# Patient Record
Sex: MALE | Race: WHITE | NOT HISPANIC OR LATINO | Employment: FULL TIME | URBAN - METROPOLITAN AREA
[De-identification: names, ages, dates, MRNs, and addresses within clinical notes are randomized per-mention and may not be internally consistent; named-entity substitution may affect disease eponyms.]

---

## 2017-11-28 ENCOUNTER — ALLSCRIPTS OFFICE VISIT (OUTPATIENT)
Dept: OTHER | Facility: OTHER | Age: 42
End: 2017-11-28

## 2017-11-28 ENCOUNTER — TRANSCRIBE ORDERS (OUTPATIENT)
Dept: ADMINISTRATIVE | Facility: HOSPITAL | Age: 42
End: 2017-11-28

## 2017-11-28 DIAGNOSIS — R19.4 CHANGE IN BOWEL HABITS: ICD-10-CM

## 2017-11-28 DIAGNOSIS — R19.4 CHANGE IN BOWEL HABITS: Primary | ICD-10-CM

## 2017-11-29 NOTE — CONSULTS
Assessment    1  Left flank pain (789 09) (R10 9)   2  Change in bowel function (787 99) (R19 4)   3  Loss of appetite (783 0) (R63 0)   4  Recent weight loss (783 21) (R63 4)    Plan  Change in bowel function    · * CT ABDOMEN PELVIS W CONTRAST; Status:Need Information - Financial Authorization; Requested for:28Nov2017;    Perform:[facility]; [phone]; Ordered; For:Change in bowel function; Ordered By:Lucas Luna;  Recent weight loss    · Suprep Bowel Prep Kit 17 5-3 13-1 6 GM/180ML Oral Solution; USE AS DIRECTED   Rx By: Susy Merlin; Dispense: 0 Days ; #:1 X 177 ML Bottle (2 Bottles); Refill: 0;For: Recent weight loss; JENNIFER = N; Verified Transmission to [facility] #7877 Last Updated By: System, SureScripts; 11/28/2017 8:46:00 AM   · COLONOSCOPY (GI, SURG); Status:Active; Requested for:28Nov2017;    Perform:[facility]; [phone]; Last Updated By:Morales, Darryle Jubilee; 11/28/2017 8:59:08 AM;Ordered; For:Recent weight loss; Ordered By:Lucas Luna;   · EGD; Status:Active; Requested for:28Nov2017;    Perform:[facility]; Due:[id number]; Last Updated By:Morales, Darryle Jubilee; 11/28/2017 8:59:08 AM;Ordered;weight loss; Ordered By:Lucas Luna;    Discussion/Summary  Discussion Summary:   Pleasant 13-year-old gentleman will with 1 month of left flank pain, found to have elevated lipase, recent change in bowel movements with new onset constipation, weight loss and decreased appetite  Elevated lipase and left flank pain: Differential includes musculoskeletal pain, underlying GI or pancreatic pathology is high in the differential, especially this male who has had an undescended testiclewill start with CT scan of his abdomen and pelvis  Recent change in bowel movements and constipation with associated weight loss:  This may be secondary to problem 1 , however the patient also has a family history of colon cancerwill schedule the patient for both an upper endoscopy and colonoscopy due to his decreased appetite and GI symptomsdiscussed with him the risks of the procedure including bleeding, surgery, perforation, missed polyp detection rate  further recommendations after his endoscopic and imaging studies are completed  Chief Complaint  Chief Complaint Free Text Note Form: Elevated lipase      History of Present Illness  HPI: As you know this is a pleasant 57-year-old gentleman with longstanding history of fairly well controlled diabetes, hyperlipidemia has been stable on his medication who notes that about a month ago he started to developed left flank pain which has radiated from his left back to his left flank  It has been consistent, worse with certain positional changes, sometimes worse with eating  Patient denies any nausea or vomiting  He does note having decreased appetite approximately 5 lb weight loss and most recently change in bowel movements or from normal daily stools a bowel movement every other day  He denies any melena or rectal bleeding  He denies any vomiting  Denies any fevers, chills, sweats  He notes that his blood sugars have increased over this period of time  Recent laboratory studies demonstrated mild elevation of his lipase for which she was referred here  family history is notable for colon cancer in his father who was diagnosed in his early to mid 62s  There is no other history of GI or associated malignancies  Patient denies any physical activity which worsen his symptoms  He does note that warm heating pad over his left flank will help   has a remote history of an undescended testicle which was not retrieved, he has had laparoscopic exploration for this in his youth  He has a history of sleep apnea  Denies any tobacco, drinks occasionally  He works in information technology  Review of Systems  Complete-Male GI Adult: Other Symptoms: The remainder of the ten ROS was negative  Active Problems    1  Abdominal pain (789 00) (R10 9)   2   Diabetes mellitus (250 00) (E11 9)   3  Hyperlipidemia (272 4) (E78 5)   4  Loss of appetite (783 0) (R63 0)   5  Recent weight loss (783 21) (R63 4)   6  Sleep apnea (780 57) (G47 30)    Past Medical History  Active Problems And Past Medical History Reviewed: The active problems and past medical history were reviewed and updated today  Surgical History  Surgical History Reviewed: The surgical history was reviewed and updated today  Family History  Mother    1  Denied: Family history of Crohn's disease without complication, unspecified gastrointestinal tract location   2  Denied: Family history of colon cancer   3  Denied: Family history of liver disease  Father    4  Denied: Family history of Crohn's disease without complication, unspecified gastrointestinal tract location   5  Family history of colon cancer (V16 0) (Z80 0)   6  Denied: Family history of liver disease  Family History Reviewed: The family history was reviewed and updated today  Social History     · Never a smoker   · Occasional alcohol use  Social History Reviewed: The social history was reviewed and updated today  Current Meds   1  Aspirin 81 MG TABS; Therapy: (Recorded:28Nov2017) to Recorded   2  Lipitor 10 MG Oral Tablet; Therapy: (Recorded:28Nov2017) to Recorded   3  Losartan Potassium 50 MG Oral Tablet; Therapy: (Recorded:28Nov2017) to Recorded   4  MetFORMIN HCl - 500 MG Oral Tablet; Therapy: (Recorded:28Nov2017) to Recorded   5  Multi-Vitamin TABS; Therapy: (Recorded:28Nov2017) to Recorded  Medication List Reviewed: The medication list was reviewed and updated today  Allergies  1   No Known Drug Allergies    Vitals  Vital Signs    Recorded: 28Nov2017 08:25AM   Temperature 96 5 F   Heart Rate 101   Respiration 16   Systolic 710   Diastolic 72   Height 5 ft 9 in   Weight 205 lb    BMI Calculated 30 27   BSA Calculated 2 09   O2 Saturation 97       Physical Exam  Gen:  Overweight, wn/wd, NAD HEENT: anicteric, MMM, no cervical LAD CVS: RRR, left lower sternal border 2/6 systolic ejection murmur  CHEST: CTA b/l ABD: +BS, soft, NT,ND, no hepatosplenomegaly, moderate to large sized ventral hernia, there was some pinpoint tenderness in his left upper quadrant which was not reproducible EXT: no c/c/e NEURO: aaox3 SKIN: NO rashes      Future Appointments    Date/Time Provider Specialty Site   12/29/2017 08:40 AM BRISEIDA Peacock   [facility] [address] [place] [address]       Signatures   Electronically signed by : BRISEIDA Rader ; Nov 28 2017  9:35AM EST                       (Author)

## 2017-12-01 ENCOUNTER — HOSPITAL ENCOUNTER (OUTPATIENT)
Dept: CT IMAGING | Facility: HOSPITAL | Age: 42
Discharge: HOME | End: 2017-12-01
Attending: INTERNAL MEDICINE
Payer: COMMERCIAL

## 2017-12-01 DIAGNOSIS — R19.4 CHANGE IN BOWEL HABITS: ICD-10-CM

## 2017-12-01 PROCEDURE — 74177 CT ABD & PELVIS W/CONTRAST: CPT

## 2017-12-01 RX ADMIN — IOHEXOL 100 ML: 350 INJECTION, SOLUTION INTRAVENOUS at 08:40

## 2017-12-05 ENCOUNTER — GENERIC CONVERSION - ENCOUNTER (OUTPATIENT)
Dept: OTHER | Facility: OTHER | Age: 42
End: 2017-12-05

## 2017-12-19 ENCOUNTER — ANESTHESIA EVENT (OUTPATIENT)
Dept: PERIOP | Facility: AMBULARY SURGERY CENTER | Age: 42
End: 2017-12-19
Payer: COMMERCIAL

## 2017-12-22 RX ORDER — ASPIRIN 81 MG/1
81 TABLET ORAL
COMMUNITY

## 2017-12-22 RX ORDER — DIPHENOXYLATE HYDROCHLORIDE AND ATROPINE SULFATE 2.5; .025 MG/1; MG/1
1 TABLET ORAL
COMMUNITY

## 2017-12-22 RX ORDER — ATORVASTATIN CALCIUM 10 MG/1
10 TABLET, FILM COATED ORAL
COMMUNITY

## 2017-12-22 RX ORDER — LOSARTAN POTASSIUM 50 MG/1
25 TABLET ORAL
COMMUNITY

## 2017-12-22 NOTE — PRE-PROCEDURE INSTRUCTIONS
Pre-Surgery Instructions:   Medication Instructions    aspirin (ECOTRIN LOW STRENGTH) 81 mg EC tablet Instructed patient per Anesthesia Guidelines   atorvastatin (LIPITOR) 10 mg tablet Instructed patient per Anesthesia Guidelines   losartan (COZAAR) 50 mg tablet Instructed patient per Anesthesia Guidelines   metFORMIN (GLUCOPHAGE) 500 mg tablet Instructed patient per Anesthesia Guidelines   multivitamin (THERAGRAN) TABS Instructed patient per Anesthesia Guidelines      Pre procedural instructions reviewed-instructed to follow Dr Emilee Pastor instructions including bowel prep

## 2017-12-29 ENCOUNTER — GENERIC CONVERSION - ENCOUNTER (OUTPATIENT)
Dept: GASTROENTEROLOGY | Facility: CLINIC | Age: 42
End: 2017-12-29

## 2017-12-29 ENCOUNTER — HOSPITAL ENCOUNTER (OUTPATIENT)
Facility: AMBULARY SURGERY CENTER | Age: 42
Setting detail: OUTPATIENT SURGERY
Discharge: HOME | End: 2017-12-29
Attending: INTERNAL MEDICINE | Admitting: INTERNAL MEDICINE
Payer: COMMERCIAL

## 2017-12-29 ENCOUNTER — ANESTHESIA (OUTPATIENT)
Dept: PERIOP | Facility: AMBULARY SURGERY CENTER | Age: 42
End: 2017-12-29
Payer: COMMERCIAL

## 2017-12-29 VITALS
BODY MASS INDEX: 29.03 KG/M2 | OXYGEN SATURATION: 98 % | HEART RATE: 79 BPM | WEIGHT: 196 LBS | SYSTOLIC BLOOD PRESSURE: 151 MMHG | DIASTOLIC BLOOD PRESSURE: 71 MMHG | TEMPERATURE: 99 F | HEIGHT: 69 IN | RESPIRATION RATE: 18 BRPM

## 2017-12-29 DIAGNOSIS — R63.4 RECENT WEIGHT LOSS: ICD-10-CM

## 2017-12-29 LAB — GLUCOSE SERPL-MCNC: 246 MG/DL (ref 65–140)

## 2017-12-29 PROCEDURE — 82948 REAGENT STRIP/BLOOD GLUCOSE: CPT

## 2017-12-29 PROCEDURE — 88305 TISSUE EXAM BY PATHOLOGIST: CPT | Performed by: INTERNAL MEDICINE

## 2017-12-29 PROCEDURE — 88342 IMHCHEM/IMCYTCHM 1ST ANTB: CPT | Performed by: INTERNAL MEDICINE

## 2017-12-29 RX ORDER — LIDOCAINE HYDROCHLORIDE 10 MG/ML
INJECTION, SOLUTION INFILTRATION; PERINEURAL AS NEEDED
Status: DISCONTINUED | OUTPATIENT
Start: 2017-12-29 | End: 2017-12-29 | Stop reason: SURG

## 2017-12-29 RX ORDER — SODIUM CHLORIDE 9 MG/ML
INJECTION, SOLUTION INTRAVENOUS CONTINUOUS PRN
Status: DISCONTINUED | OUTPATIENT
Start: 2017-12-29 | End: 2017-12-29 | Stop reason: SURG

## 2017-12-29 RX ORDER — PROPOFOL 10 MG/ML
INJECTION, EMULSION INTRAVENOUS AS NEEDED
Status: DISCONTINUED | OUTPATIENT
Start: 2017-12-29 | End: 2017-12-29 | Stop reason: SURG

## 2017-12-29 RX ADMIN — PROPOFOL 150 MG: 10 INJECTION, EMULSION INTRAVENOUS at 09:23

## 2017-12-29 RX ADMIN — LIDOCAINE HYDROCHLORIDE 50 MG: 10 INJECTION, SOLUTION INFILTRATION; PERINEURAL at 09:23

## 2017-12-29 RX ADMIN — PROPOFOL 50 MG: 10 INJECTION, EMULSION INTRAVENOUS at 09:29

## 2017-12-29 RX ADMIN — SODIUM CHLORIDE: 0.9 INJECTION, SOLUTION INTRAVENOUS at 08:48

## 2017-12-29 RX ADMIN — PROPOFOL 50 MG: 10 INJECTION, EMULSION INTRAVENOUS at 09:35

## 2017-12-29 RX ADMIN — PROPOFOL 50 MG: 10 INJECTION, EMULSION INTRAVENOUS at 09:25

## 2017-12-29 NOTE — DISCHARGE INSTRUCTIONS
Discharge home  Resume regular diet  Resume home medications  Follow up biopsy results, call the office in 3 weeks for results  Call with any abdominal pain, bleeding, fevers

## 2017-12-29 NOTE — ANESTHESIA PREPROCEDURE EVALUATION
Review of Systems/Medical History  Patient summary reviewed  Chart reviewed  No history of anesthetic complications     Cardiovascular  Exercise tolerance: good,  Hyperlipidemia, Hypertension controlled,    Pulmonary  Sleep apnea CPAP, ,        GI/Hepatic  Negative GI/hepatic ROS          Negative  ROS        Endo/Other  Diabetes type 2 Oral agent,      GYN  Negative gynecology ROS          Hematology  Negative hematology ROS      Musculoskeletal  Negative musculoskeletal ROS        Neurology  Negative neurology ROS      Psychology   Negative psychology ROS            Physical Exam    Airway    Mallampati score: III  TM Distance: >3 FB  Neck ROM: full     Dental   No notable dental hx     Cardiovascular      Pulmonary      Other Findings        Anesthesia Plan  ASA Score- 2     Anesthesia Type- IV sedation with anesthesia with ASA Monitors  Additional Monitors:   Airway Plan:         Plan Factors- Patient instructed to abstain from smoking on day of procedure  Patient did not smoke on day of surgery  Induction- intravenous  Postoperative Plan-     Informed Consent- Anesthetic plan and risks discussed with patient  I personally reviewed this patient with the CRNA  Discussed and agreed on the Anesthesia Plan with the CRNA  Bernie Bowers

## 2017-12-29 NOTE — H&P
History and Physical -  [facility]  Celso Leal 38 y o  male MRN: [id number]    HPI: Celso Leal is a 38y o  year old male who presents with weight loss, decreased appetite, left flank pain, family hx of colon cancer  Review of Systems    Historical Information   Past Medical History:   Diagnosis Date    CPAP (continuous positive airway pressure) dependence     Diabetes mellitus ([address] )     Heart murmur     since childhood    Hyperlipidemia     Hypertension     Sleep apnea      Past Surgical History:   Procedure Laterality Date    FOOT SURGERY      bunion surgery    UNDESCENDED TESTICLE EXPLORATION      VASECTOMY       Social History   History   Alcohol Use    Yes     Comment: rare socially     History   Drug Use No     History   Smoking Status    Former Smoker    Quit date: 2007   Smokeless Tobacco    Never Used     Family History   Problem Relation Age of Onset    Colon cancer Father        Meds/Allergies     Prescriptions Prior to Admission   Medication    aspirin (ECOTRIN LOW STRENGTH) 81 mg EC tablet    atorvastatin (LIPITOR) 10 mg tablet    losartan (COZAAR) 50 mg tablet    metFORMIN (GLUCOPHAGE) 500 mg tablet    multivitamin (THERAGRAN) TABS       No Known Allergies    Objective     Blood pressure (!) 171/81, pulse 93, temperature (!) 96 7 °F (35 9 °C), temperature source Temporal, resp  rate 16, height 5' 9" (1 753 m), weight 88 9 kg (196 lb)  PHYSICAL EXAM    Gen: NAD  CV: RRR  CHEST: Clear  ABD: soft, NT/ND  EXT: no edema  Neuro: AAO      ASSESSMENT/PLAN:  This is a 38y o  year old male here for  weight loss, decreased appetite, left flank pain, family hx of colon cancer         PLAN:   Procedure: egd/colonoscopy

## 2017-12-29 NOTE — OP NOTE
COLONOSCOPY    PROCEDURE: Colonoscopy    INDICATIONS: Abdominal Pain, Chronic, weight loss    POST-OP DIAGNOSIS: See the impression below    SEDATION: Monitored anesthesia care, check anesthesia records    PHYSICAL EXAM:    BP (!) 171/81   Pulse 93   Temp (!) 96 7 °F (35 9 °C) (Temporal)   Resp 16   Ht 5' 9" (1 753 m)   Wt 88 9 kg (196 lb)   BMI 28 94 kg/m²   Body mass index is 28 94 kg/m²  General: NAD  Heart: S1 & S2 normal, RRR  Lungs: CTA, No rales or rhonchi  Abdomen: Soft, nontender, nondistended, good bowel sounds    CONSENT:  Informed consent was obtained for the procedure, including sedation after explaining the risks and benefits of the procedure  Risks including but not limited to bleeding, perforation, infection, aspiration were discussed in detail  Also explained about less than 100%$ sensitivity with the exam and other alternatives  PREPARATION:   EKG tracing, pulse oximetry, blood pressure were monitored throughout the procedure  Patient was identified by myself both verbally and by visual inspection of ID band  DESCRIPTION:   Patient was placed in the left lateral decubitus position and was sedated with the above medication  Digital rectal examination was performed  The colonoscope was introduced in to the anal canal and advanced up to terminal ileum  A careful inspection was made as the colonoscope was withdrawn, including a retroflexed view of the rectum; findings and interventions are described below  Appropriate photodocumentation was obtained  The quality of the colonic preparation was good      FINDINGS:    Normal colonoscopy to the terminal ileum with good prep good visualization  Normal retroflexion         IMPRESSIONS:      Normal colonoscopy to the terminal ileum with no evidence of malignancy, polyps, inflammatory changes    RECOMMENDATIONS:    Discharge home  Resume regular diet  Resume home medications  Repeat colonoscopy in 10 years for screening purposes  Call with any abdominal pain, bleeding, fevers    COMPLICATIONS:  None; patient tolerated the procedure well      DISPOSITION: PACU           CONDITION: Stable

## 2017-12-29 NOTE — ANESTHESIA POSTPROCEDURE EVALUATION
Post-Op Assessment Note      CV Status:  Stable    Mental Status:  Somnolent    Hydration Status:  Euvolemic    PONV Controlled:  Controlled    Airway Patency:  Patent    Post Op Vitals Reviewed: Yes          Staff: CRNA           /78 (12/29/17 0942)    Temp 99 °F (37 2 °C) (12/29/17 0942)    Pulse 83 (12/29/17 0942)   Resp 12 (12/29/17 0942)    SpO2 97 % (12/29/17 0942)

## 2017-12-29 NOTE — OP NOTE
ESOPHAGOGASTRODUODENOSCOPY    PROCEDURE: EGD/ Biopsy    INDICATIONS: Abdominal pain, Epigastric and Anorexia and Weight Loss    POST-OP DIAGNOSIS: See the impression below    SEDATION: Monitored anesthesia care, check anesthesia records    PHYSICAL EXAM:    Vitals:    12/29/17 0834   BP: (!) 171/81   Pulse: 93   Resp: 16   Temp: (!) 96 7 °F (35 9 °C)    Body mass index is 28 94 kg/m²  General: NAD  Heart: S1 & S2 normal, RRR  Lungs: CTA, No rales or rhonchi  Abdomen: Soft, nontender, nondistended, good bowel sounds    CONSENT:  Informed consent was obtained for the procedure, including sedation after explaining the risks and benefits of the procedure  Risks including but not limited to bleeding, perforation, infection, aspiration were discussed in detail  Also explained about less than 100% sensitivity with the exam and other alternatives  PREPARATION:   EKG tracing, pulse oximetry, blood pressure were monitored throughout the procedure  Patient was identified by myself both verbally and by visual inspection of ID band  DESCRIPTION:   Patient was placed in the left lateral decubitus position and was sedated with the above medication  The gastroscope was introduced in to the oropharynx and the esophagus was intubated under direct visualization  Scope was passed down the esophagus up to 2nd part of the duodenum  A careful inspection was made as the gastroscope was withdrawn, including a retroflexed view of the stomach; findings and interventions are described below  FINDINGS:    #1  Esophagus and GEJ-normal esophagus and GE junction    #2  Stomach-mild gastritis, biopsies taken  Normal retroflexion    #3   Duodenum-mild duodenitis, biopsies from 2nd portion of duodenum were taken         IMPRESSIONS:      Duodenitis and gastritis  Duodenal and gastric biopsies taken  Otherwise normal examination with normal retroflexion and normal esophagus    RECOMMENDATIONS:     Discharge home  Resume regular diet  Resume home medications  Follow up biopsy results, call the office in 3 weeks for results  Call with any abdominal pain, bleeding, fevers    COMPLICATIONS:  None; patient tolerated the procedure well            DISPOSITION: PACU           CONDITION: Stable

## 2018-01-07 ENCOUNTER — GENERIC CONVERSION - ENCOUNTER (OUTPATIENT)
Dept: OTHER | Facility: OTHER | Age: 43
End: 2018-01-07

## 2018-01-12 VITALS
SYSTOLIC BLOOD PRESSURE: 142 MMHG | OXYGEN SATURATION: 97 % | TEMPERATURE: 96.5 F | HEIGHT: 69 IN | BODY MASS INDEX: 30.36 KG/M2 | WEIGHT: 205 LBS | RESPIRATION RATE: 16 BRPM | HEART RATE: 101 BPM | DIASTOLIC BLOOD PRESSURE: 72 MMHG

## 2018-01-16 NOTE — CONSULTS
Chief Complaint  Elevated lipase      History of Present Illness  As you know this is a pleasant 49-year-old gentleman with longstanding history of fairly well controlled diabetes, hyperlipidemia has been stable on his medication who notes that about a month ago he started to developed left flank pain which has radiated from his left back to his left flank  It has been consistent, worse with certain positional changes, sometimes worse with eating  Patient denies any nausea or vomiting  He does note having decreased appetite approximately 5 lb weight loss and most recently change in bowel movements or from normal daily stools a bowel movement every other day  He denies any melena or rectal bleeding  He denies any vomiting  Denies any fevers, chills, sweats  He notes that his blood sugars have increased over this period of time  Recent laboratory studies demonstrated mild elevation of his lipase for which she was referred here  Patient's family history is notable for colon cancer in his father who was diagnosed in his early to mid 62s  There is no other history of GI or associated malignancies  Patient denies any physical activity which worsen his symptoms  He does note that warm heating pad over his left flank will help  He has a remote history of an undescended testicle which was not retrieved, he has had laparoscopic exploration for this in his youth  He has a history of sleep apnea  Denies any tobacco, drinks occasionally  He works in [employer]  Review of Systems    Other Symptoms: The remainder of the ten ROS was negative  Active Problems    1  Abdominal pain (789 00) (R10 9)   2  Diabetes mellitus (250 00) (E11 9)   3  Hyperlipidemia (272 4) (E78 5)   4  Loss of appetite (783 0) (R63 0)   5  Recent weight loss (783 21) (R63 4)   6  Sleep apnea (780 57) (G47 30)    Past Medical History    The active problems and past medical history were reviewed and updated today        Surgical History    The surgical history was reviewed and updated today  Family History    · Denied: Family history of Crohn's disease without complication, unspecified  gastrointestinal tract location   · Denied: Family history of colon cancer   · Denied: Family history of liver disease    · Denied: Family history of Crohn's disease without complication, unspecified  gastrointestinal tract location   · Family history of colon cancer (V16 0) (Z80 0)   · Denied: Family history of liver disease    The family history was reviewed and updated today  Social History    · Never a smoker   · Occasional alcohol use  The social history was reviewed and updated today  Current Meds   1  Aspirin 81 MG TABS; Therapy: (Recorded:28Nov2017) to Recorded   2  Lipitor 10 MG Oral Tablet; Therapy: (Recorded:28Nov2017) to Recorded   3  Losartan Potassium 50 MG Oral Tablet; Therapy: (Recorded:28Nov2017) to Recorded   4  MetFORMIN HCl - 500 MG Oral Tablet; Therapy: (Recorded:28Nov2017) to Recorded   5  Multi-Vitamin TABS; Therapy: (Recorded:28Nov2017) to Recorded    The medication list was reviewed and updated today  Allergies    1  No Known Drug Allergies    Vitals   Recorded: [id number] 08:25AM   Temperature 96 5 F   Heart Rate 101   Respiration 16   Systolic 431   Diastolic 72   Height 5 ft 9 in   Weight 205 lb    BMI Calculated 30 27   BSA Calculated 2 09   O2 Saturation 97     Physical Exam  Gen:  Overweight, wn/wd, NAD  HEENT: anicteric, MMM, no cervical LAD  CVS: RRR, left lower sternal border 2/6 systolic ejection murmur   CHEST: CTA b/l  ABD: +BS, soft, NT,ND, no hepatosplenomegaly, moderate to large sized ventral hernia, there was some pinpoint tenderness in his left upper quadrant which was not reproducible  EXT: no c/c/e  NEURO: aaox3  SKIN: NO rashes         Assessment    1  Left flank pain (789 09) (R10 9)   2  Change in bowel function (787 99) (R19 4)   3  Loss of appetite (783 0) (R63 0)   4   Recent weight loss (783 21) (R63 4)    Plan  Change in bowel function    · * CT ABDOMEN PELVIS W CONTRAST; Status:Need Information - Financial Authorization; Requested for:28Nov2017;    Perform:[facility]; [phone]; Ordered; For:Change in bowel function; Ordered By:Lucas Luna;  Recent weight loss    · Suprep Bowel Prep Kit 17 5-3 13-1 6 GM/180ML Oral Solution; USE AS DIRECTED   Rx By: Randell Hudson; Dispense: 0 Days ; #:1 X 177 ML Bottle (2 Bottles); Refill: 0; For: Recent weight loss; JENNIFER = N; Verified Transmission to [facility]4959 Last Updated By: System, SureScripts; 11/28/2017 8:46:00 AM   · COLONOSCOPY (GI, SURG); Status:Active; Requested for:28Nov2017;    Perform:[facility]; [phone]; Last Updated By:Radames Boogie; 11/28/2017 8:59:08 AM;Ordered; For:Recent weight loss; Ordered By:Lucas Luna;   · EGD; Status:Active; Requested for:28Nov2017;    Perform:[facility]; Due:[id number]; Last Updated By:Radames Boogie; 11/28/2017 8:59:08 AM;Ordered; For:Recent weight loss; Ordered By:Lucas Luna;    Discussion/Summary    Pleasant 26-year-old gentleman will with 1 month of left flank pain, found to have elevated lipase, recent change in bowel movements with new onset constipation, weight loss and decreased appetite  1  Elevated lipase and left flank pain: Differential includes musculoskeletal pain, underlying GI or pancreatic pathology is high in the differential, especially this male who has had an undescended testicle  -we will start with CT scan of his abdomen and pelvis    2  Recent change in bowel movements and constipation with associated weight loss:  This may be secondary to problem 1 , however the patient also has a family history of colon cancer  -we will schedule the patient for both an upper endoscopy and colonoscopy due to his decreased appetite and GI symptoms  -I discussed with him the risks of the procedure including bleeding, surgery, perforation, missed polyp detection rate    Make further recommendations after his endoscopic and imaging studies are completed        Future Appointments    Signatures   Electronically signed by : BRISEIDA Ochoa ; Nov 28 2017  9:35AM EST                       (Author)

## 2018-01-23 NOTE — RESULT NOTES
Discussion/Summary   Please inform the patient that his small-bowel biopsies are normal, his stomach biopsies are normal as well  Due to his family history of colon cancer please put in for colonoscopy recall in 5 years  Please have the patient call with any questions or concerns any continues to have left-sided abdominal pain please have him follow-up in the office  Verified Results  (1) TISSUE EXAM [id number] 09:26AM Kassie Johnson     Test Name Result Flag Reference   LAB AP CASE REPORT (Report)     Surgical Pathology Report             Case: [id number]                   Authorizing Provider: Yuridia Livingston MD      Collected:      12/29/2017 0926        Ordering Location:   [facility]    Received:      12/29/2017 [address] [place] [address]                            Pathologist:      Ofilia Harada, MD                                 Specimens:  A) - Duodenum, duodenum r/o celiac                                   B) - Stomach, gastric r/o h pylori   LAB AP FINAL DIAGNOSIS (Report)     A  Duodenum (biopsy):    - Small bowel mucosa with mild nonspecific increase in lamina propria   acute and chronic inflammation     - No villous atrophy or marked increase in intraepithelial lymphocytes  - No granulomas, dysplasia or neoplasia identified  B  Stomach (biopsy):    - Gastric antral and oxyntic mucosa with no significant pathologic   abnormality     - Immunostain for H  pylori (with appropriate positive control) is   negative  - No intestinal metaplasia, dysplasia or neoplasia identified  Electronically signed by Ofilia Harada, MD on 1/2/2018 at 9:28 AM   LAB AP NOTE      Interpretation performed at [facility], [address] [place] [address], [address] [place] [id number]  LAB AP SURGICAL ADDITIONAL INFORMATION (Report)     All controls performed with the immunohistochemical stains reported above   reacted appropriately   These tests were developed and their performance   characteristics determined by [facility] or   [address] [place] [address]  They may not be cleared or approved by the [place]  Food and Drug Administration  The FDA has determined that such clearance   or approval is not necessary  These tests are used for clinical purposes  They should not be regarded as investigational or for research  This   laboratory has been approved by [place] [address], designated as a high-complexity   laboratory and is qualified to perform these tests  LAB AP GROSS DESCRIPTION (Report)     A  The specimen is received in formalin, labeled with the patient's name   and hospital number, and is designated duodenum rule out celiac  The   specimen consists of multiple tan-red soft tissue fragments measuring in   aggregate 0 6 x 0 5 x 0 1 cm  Entirely submitted  One cassette  B  The specimen is received in formalin, labeled with the patient's name   and hospital number, and is designated gastric rule out H pylori  The   specimen consists of 4 tan red soft tissue fragments each measuring   0 2-0 4 cm  Entirely submitted  One cassette  Note: The estimated total formalin fixation time based upon information   provided by the submitting clinician and the standard processing schedule   is under 72 hours      [facility]   LAB AP CLINICAL INFORMATION Recent weight loss

## 2018-01-23 NOTE — RESULT NOTES
Discussion/Summary   Please inform the patient that CT scan shows an enlarged and fatty liver  Otherwise his abdomen is normal  His pancreas is normal with no evidence of concerning findings  We will proceed with endoscopic evaluation as ordered  Please have the patient call with any questions or concerns  This is good news, there is no significant pancreatic changes on this CAT scan  Verified Results  * CT ABDOMEN PELVIS W CONTRAST [id number] 07:50AM Oriana ANTUNEZ Order Number: [id number]    - Patient Instructions: To schedule this appointment, please contact Central Scheduling at [phone]  Test Name Result Flag Reference   CT ABDOMEN PELVIS W CONTRAST (Report)     CT ABDOMEN AND PELVIS WITH IV CONTRAST     INDICATION: Left flank pain and elevated lipase level  Constipation, weight loss and decreased appetite  COMPARISON: Abdominal sonogram from March 23, 2015  TECHNIQUE: CT examination of the abdomen and pelvis was performed  Reformatted images were created in axial, sagittal, and coronal planes  Radiation dose length product (DLP) for this visit: 880 mGy-cm   This examination, like all CT scans performed in the [facility], was performed utilizing techniques to minimize radiation dose exposure, including the use of iterative    reconstruction and automated exposure control  IV Contrast: 100 mL of iohexol (OMNIPAQUE)      Enteric Contrast: Enteric contrast was administered  FINDINGS:     ABDOMEN     LOWER CHEST: No significant abnormalities identified in the lower chest      LIVER/BILIARY TREE: Liver enlarged, measuring 22 cm in length  Diffusely decreased liver attenuation, indicating fatty infiltration  No masses  Bile ducts normal in caliber  GALLBLADDER: No calcified gallstones  No pericholecystic inflammatory change  SPLEEN: Unremarkable  PANCREAS: Unremarkable  No evidence of mass or pancreatitis  No ductal dilatation  ADRENAL GLANDS: Unremarkable  KIDNEYS/URETERS: Unremarkable  No hydronephrosis  STOMACH AND BOWEL: Unremarkable  APPENDIX: No findings to suggest appendicitis  ABDOMINOPELVIC CAVITY: No lymphadenopathy or mass  No ascites  No extraluminal gas  VESSELS: Unremarkable for patient's age  PELVIS     REPRODUCTIVE ORGANS: Prostate and seminal vesicles unremarkable  URINARY BLADDER: Unremarkable  ABDOMINAL WALL/INGUINAL REGIONS: Unremarkable  OSSEOUS STRUCTURES: No acute fracture or destructive osseous lesion  IMPRESSION:     1  Hepatomegaly with hepatic steatosis  2  Otherwise unremarkable CT the abdomen and pelvis         Workstation performed: [id number]     Signed by:   Brayan Cevallos MD   12/4/17

## 2020-08-16 ENCOUNTER — OFFICE VISIT (OUTPATIENT)
Dept: URGENT CARE | Facility: MEDICAL CENTER | Age: 45
End: 2020-08-16
Payer: COMMERCIAL

## 2020-08-16 VITALS
TEMPERATURE: 96.8 F | OXYGEN SATURATION: 96 % | DIASTOLIC BLOOD PRESSURE: 69 MMHG | SYSTOLIC BLOOD PRESSURE: 154 MMHG | HEART RATE: 96 BPM | WEIGHT: 187 LBS | RESPIRATION RATE: 18 BRPM | HEIGHT: 69 IN | BODY MASS INDEX: 27.7 KG/M2

## 2020-08-16 DIAGNOSIS — S65.511A LACERATION OF BLOOD VESSEL OF LEFT INDEX FINGER, INITIAL ENCOUNTER: Primary | ICD-10-CM

## 2020-08-16 PROCEDURE — 90715 TDAP VACCINE 7 YRS/> IM: CPT

## 2020-08-16 PROCEDURE — 12001 RPR S/N/AX/GEN/TRNK 2.5CM/<: CPT | Performed by: PHYSICIAN ASSISTANT

## 2020-08-16 PROCEDURE — 90471 IMMUNIZATION ADMIN: CPT | Performed by: PHYSICIAN ASSISTANT

## 2020-08-16 PROCEDURE — 99213 OFFICE O/P EST LOW 20 MIN: CPT | Performed by: PHYSICIAN ASSISTANT

## 2020-08-16 NOTE — PATIENT INSTRUCTIONS
Laceration left index  Return in 7 days for suture removal  Follow up with PCP in 3-5 days  Proceed to  ER if symptoms worsen  Laceration   WHAT YOU NEED TO KNOW:   A laceration is an injury to the skin and the soft tissue underneath it  Lacerations happen when you are cut or hit by something  They can happen anywhere on the body  DISCHARGE INSTRUCTIONS:   Return to the emergency department if:   · You have heavy bleeding or bleeding that does not stop after 10 minutes of holding firm, direct pressure over the wound  · Your wound opens up  Contact your healthcare provider if:   · You have a fever or chills  · Your laceration is red, warm, or swollen  · You have red streaks on your skin coming from your wound  · You have white or yellow drainage from the wound that smells bad  · You have pain that gets worse, even after treatment  · You have questions or concerns about your condition or care  Medicines:   · Prescription pain medicine  may be given  Ask how to take this medicine safely  · Antibiotics  help treat or prevent a bacterial infection  · Take your medicine as directed  Contact your healthcare provider if you think your medicine is not helping or if you have side effects  Tell him or her if you are allergic to any medicine  Keep a list of the medicines, vitamins, and herbs you take  Include the amounts, and when and why you take them  Bring the list or the pill bottles to follow-up visits  Carry your medicine list with you in case of an emergency  Care for your wound as directed:   · Do not get your wound wet  until your healthcare provider says it is okay  Do not soak your wound in water  Do not go swimming until your healthcare provider says it is okay  Carefully wash the wound with soap and water  Gently pat the area dry or allow it to air dry  · Change your bandages  when they get wet, dirty, or after washing  Apply new, clean bandages as directed   Do not apply elastic bandages or tape too tight  Do not put powders or lotions over your incision  · Apply antibiotic ointment as directed  Your healthcare provider may give you antibiotic ointment to put over your wound if you have stitches  If you have strips of tape over your incision, let them dry up and fall off on their own  If they do not fall off within 14 days, gently remove them  If you have glue over your wound, do not remove or pick at it  If your glue comes off, do not replace it with glue that you have at home  · Check your wound every day for signs of infection such as swelling, redness, or pus  Self-care:   · Apply ice  on your wound for 15 to 20 minutes every hour or as directed  Use an ice pack, or put crushed ice in a plastic bag  Cover it with a towel  Ice helps prevent tissue damage and decreases swelling and pain  · Use a splint as directed  A splint will decrease movement and stress on your wound  It may help it heal faster  A splint may be used for lacerations over joints or areas of your body that bend  Ask your healthcare provider how to apply and remove a splint  · Decrease scarring of your wound  by applying ointments as directed  Do not apply ointments until your healthcare provider says it is okay  You may need to wait until your wound is healed  Ask which ointment to buy and how often to use it  After your wound is healed, use sunscreen over the area when you are out in the sun  You should do this for at least 6 months to 1 year after your injury  Follow up with your healthcare provider as directed: You may need to follow up in 24 to 48 hours to have your wound checked for infection  You will need to return in 3 to 14 days if you have stitches or staples so they can be removed  Care for your wound as directed to prevent infection and help it heal  Write down your questions so you remember to ask them during your visits    © 2017 [employer] Information is for End User's use only and may not be sold, redistributed or otherwise used for commercial purposes  All illustrations and images included in CareNotes® are the copyrighted property of [employer]  or [employer]  The above information is an  only  It is not intended as medical advice for individual conditions or treatments  Talk to your doctor, nurse or pharmacist before following any medical regimen to see if it is safe and effective for you

## 2020-08-16 NOTE — PROGRESS NOTES
[facility]        NAME: Fany Carpenter is a 53 y o  male  : 1975    MRN: [id number]  DATE: 2020  TIME: 5:37 PM    Assessment and Plan   Laceration of blood vessel of left index finger, initial encounter [S65 511A]  1  Laceration of blood vessel of left index finger, initial encounter           Patient Instructions     Laceration left index  Return in 7 days for suture removal  Follow up with PCP in 3-5 days  Proceed to  ER if symptoms worsen  Chief Complaint     Chief Complaint   Patient presents with    Laceration     Pt  lacerated his left index finger on hedge clippers  History of Present Illness       52 y/o male presents c/o having cut his finger with  at home today  Denies other trauma      Review of Systems   Review of Systems   Constitutional: Negative  HENT: Negative  Eyes: Negative  Respiratory: Negative  Negative for apnea, cough, choking, chest tightness, shortness of breath, wheezing and stridor  Cardiovascular: Negative  Negative for chest pain  Skin: Positive for wound           Current Medications       Current Outpatient Medications:     aspirin (ECOTRIN LOW STRENGTH) 81 mg EC tablet, Take 81 mg by mouth daily in the early morning, Disp: , Rfl:     atorvastatin (LIPITOR) 10 mg tablet, Take 10 mg by mouth daily at bedtime, Disp: , Rfl:     losartan (COZAAR) 50 mg tablet, Take 25 mg by mouth daily in the early morning, Disp: , Rfl:     metFORMIN (GLUCOPHAGE) 500 mg tablet, Take 500 mg by mouth 2 (two) times a day with meals, Disp: , Rfl:     multivitamin (THERAGRAN) TABS, Take 1 tablet by mouth daily in the early morning, Disp: , Rfl:     Omega-3 Fatty Acids (FISH OIL PO), Take by mouth, Disp: , Rfl:     Current Allergies     Allergies as of 2020    (No Known Allergies)            The following portions of the patient's history were reviewed and updated as appropriate: allergies, current medications, past family history, past medical history, past social history, past surgical history and problem list      Past Medical History:   Diagnosis Date    CPAP (continuous positive airway pressure) dependence     Diabetes mellitus ([address] )     Heart murmur     since childhood    Hyperlipidemia     Hypertension     Sleep apnea        Past Surgical History:   Procedure Laterality Date    FOOT SURGERY      bunion surgery    HI COLONOSCOPY FLX DX W/COLLJ SPEC WHEN PFRMD N/A 12/29/2017    Procedure: EGD AND COLONOSCOPY;  Surgeon: Vladimir Pierce MD;  [facility]; Service: Gastroenterology    UNDESCENDED TESTICLE EXPLORATION      VASECTOMY         Family History   Problem Relation Age of Onset    Colon cancer Father          Medications have been verified  Objective   /69   Pulse 96   Temp (!) 96 8 °F (36 °C) (Temporal)   Resp 18   Ht 5' 9" (1 753 m)   Wt 84 8 kg (187 lb)   SpO2 96%   BMI 27 62 kg/m²          Physical Exam     Physical Exam  Constitutional:       General: He is not in acute distress  Appearance: He is well-developed  He is not diaphoretic  Neck:      Musculoskeletal: Normal range of motion and neck supple  Cardiovascular:      Rate and Rhythm: Normal rate and regular rhythm  Heart sounds: Normal heart sounds  Pulmonary:      Effort: Pulmonary effort is normal  No respiratory distress  Breath sounds: Normal breath sounds  No wheezing or rales  Chest:      Chest wall: No tenderness  Musculoskeletal:        Hands:    Lymphadenopathy:      Cervical: No cervical adenopathy  Laceration repair    Date/Time: 8/16/2020 5:39 PM  Performed by: Silverio Landry PA-C  Authorized by: Silverio Landry PA-C   Consent: Verbal consent obtained    Risks and benefits: risks, benefits and alternatives were discussed  Consent given by: patient  Patient understanding: patient states understanding of the procedure being performed  Patient identity confirmed: verbally with patient  Time out: Immediately prior to procedure a "time out" was called to verify the correct patient, procedure, equipment, support staff and site/side marked as required    Body area: upper extremity  Location details: left index finger  Laceration length: 1 cm  Foreign bodies: no foreign bodies  Tendon involvement: none  Nerve involvement: none  Vascular damage: no  Anesthesia: local infiltration    Anesthesia:  Local Anesthetic: lidocaine 1% without epinephrine  Anesthetic total: 1 mL    Sedation:  Patient sedated: no      Wound Dehiscence:  Superficial Wound Dehiscence: simple closure      Procedure Details:  Irrigation solution: saline  Irrigation method: jet lavage  Amount of cleaning: standard  Debridement: none  Degree of undermining: none  Skin closure: 4-0 nylon  Number of sutures: 2  Technique: simple  Approximation: close  Approximation difficulty: simple  Dressing: 4x4 sterile gauze and antibiotic ointment  Patient tolerance: Patient tolerated the procedure well with no immediate complications

## 2020-09-29 ENCOUNTER — APPOINTMENT (OUTPATIENT)
Dept: LAB | Facility: MEDICAL CENTER | Age: 45
End: 2020-09-29
Payer: COMMERCIAL

## 2020-09-29 ENCOUNTER — TRANSCRIBE ORDERS (OUTPATIENT)
Dept: ADMINISTRATIVE | Facility: HOSPITAL | Age: 45
End: 2020-09-29

## 2020-09-29 DIAGNOSIS — E11.9 TYPE 2 DIABETES MELLITUS WITHOUT COMPLICATION, UNSPECIFIED WHETHER LONG TERM INSULIN USE (HCC): ICD-10-CM

## 2020-09-29 DIAGNOSIS — E78.2 MIXED HYPERLIPIDEMIA: ICD-10-CM

## 2020-09-29 DIAGNOSIS — I10 ESSENTIAL HYPERTENSION, BENIGN: ICD-10-CM

## 2020-09-29 DIAGNOSIS — E78.2 MIXED HYPERLIPIDEMIA: Primary | ICD-10-CM

## 2020-09-29 LAB
ALBUMIN SERPL BCP-MCNC: 3.8 G/DL (ref 3.5–5)
ALP SERPL-CCNC: 68 U/L (ref 46–116)
ALT SERPL W P-5'-P-CCNC: 72 U/L (ref 12–78)
ANION GAP SERPL CALCULATED.3IONS-SCNC: 5 MMOL/L (ref 4–13)
AST SERPL W P-5'-P-CCNC: 49 U/L (ref 5–45)
BILIRUB SERPL-MCNC: 0.5 MG/DL (ref 0.2–1)
BUN SERPL-MCNC: 13 MG/DL (ref 5–25)
CALCIUM SERPL-MCNC: 9.8 MG/DL (ref 8.3–10.1)
CHLORIDE SERPL-SCNC: 105 MMOL/L (ref 100–108)
CHOLEST SERPL-MCNC: 137 MG/DL (ref 50–200)
CO2 SERPL-SCNC: 29 MMOL/L (ref 21–32)
CREAT SERPL-MCNC: 0.65 MG/DL (ref 0.6–1.3)
EST. AVERAGE GLUCOSE BLD GHB EST-MCNC: 151 MG/DL
GFR SERPL CREATININE-BSD FRML MDRD: 116 ML/MIN/1.73SQ M
GLUCOSE P FAST SERPL-MCNC: 166 MG/DL (ref 65–99)
HBA1C MFR BLD: 6.9 %
HDLC SERPL-MCNC: 41 MG/DL
LDLC SERPL CALC-MCNC: 70 MG/DL (ref 0–100)
NONHDLC SERPL-MCNC: 96 MG/DL
POTASSIUM SERPL-SCNC: 4.5 MMOL/L (ref 3.5–5.3)
PROT SERPL-MCNC: 7.6 G/DL (ref 6.4–8.2)
SODIUM SERPL-SCNC: 139 MMOL/L (ref 136–145)
TRIGL SERPL-MCNC: 131 MG/DL

## 2020-09-29 PROCEDURE — 80061 LIPID PANEL: CPT

## 2020-09-29 PROCEDURE — 80053 COMPREHEN METABOLIC PANEL: CPT

## 2020-09-29 PROCEDURE — 83036 HEMOGLOBIN GLYCOSYLATED A1C: CPT

## 2020-09-29 PROCEDURE — 36415 COLL VENOUS BLD VENIPUNCTURE: CPT

## 2021-05-28 ENCOUNTER — APPOINTMENT (OUTPATIENT)
Dept: LAB | Facility: MEDICAL CENTER | Age: 46
End: 2021-05-28
Payer: COMMERCIAL

## 2021-05-28 ENCOUNTER — TRANSCRIBE ORDERS (OUTPATIENT)
Dept: ADMINISTRATIVE | Facility: HOSPITAL | Age: 46
End: 2021-05-28

## 2021-05-28 DIAGNOSIS — I10 ESSENTIAL HYPERTENSION, BENIGN: Primary | ICD-10-CM

## 2021-05-28 DIAGNOSIS — E11.9 DIABETES MELLITUS WITHOUT COMPLICATION (HCC): ICD-10-CM

## 2021-05-28 DIAGNOSIS — E78.2 MIXED HYPERLIPIDEMIA: ICD-10-CM

## 2021-05-28 LAB
ALBUMIN SERPL BCP-MCNC: 3.5 G/DL (ref 3.5–5)
ALP SERPL-CCNC: 83 U/L (ref 46–116)
ALT SERPL W P-5'-P-CCNC: 56 U/L (ref 12–78)
ANION GAP SERPL CALCULATED.3IONS-SCNC: 4 MMOL/L (ref 4–13)
AST SERPL W P-5'-P-CCNC: 29 U/L (ref 5–45)
BILIRUB SERPL-MCNC: 0.43 MG/DL (ref 0.2–1)
BUN SERPL-MCNC: 11 MG/DL (ref 5–25)
CALCIUM SERPL-MCNC: 9.4 MG/DL (ref 8.3–10.1)
CHLORIDE SERPL-SCNC: 103 MMOL/L (ref 100–108)
CHOLEST SERPL-MCNC: 127 MG/DL (ref 50–200)
CO2 SERPL-SCNC: 30 MMOL/L (ref 21–32)
CREAT SERPL-MCNC: 0.55 MG/DL (ref 0.6–1.3)
EST. AVERAGE GLUCOSE BLD GHB EST-MCNC: 203 MG/DL
GFR SERPL CREATININE-BSD FRML MDRD: 124 ML/MIN/1.73SQ M
GLUCOSE P FAST SERPL-MCNC: 275 MG/DL (ref 65–99)
HBA1C MFR BLD: 8.7 %
HDLC SERPL-MCNC: 39 MG/DL
LDLC SERPL CALC-MCNC: 57 MG/DL (ref 0–100)
NONHDLC SERPL-MCNC: 88 MG/DL
POTASSIUM SERPL-SCNC: 4.4 MMOL/L (ref 3.5–5.3)
PROT SERPL-MCNC: 7.1 G/DL (ref 6.4–8.2)
SODIUM SERPL-SCNC: 137 MMOL/L (ref 136–145)
TRIGL SERPL-MCNC: 154 MG/DL

## 2021-05-28 PROCEDURE — 83036 HEMOGLOBIN GLYCOSYLATED A1C: CPT | Performed by: FAMILY MEDICINE

## 2021-05-28 PROCEDURE — 80053 COMPREHEN METABOLIC PANEL: CPT | Performed by: FAMILY MEDICINE

## 2021-05-28 PROCEDURE — 36415 COLL VENOUS BLD VENIPUNCTURE: CPT | Performed by: FAMILY MEDICINE

## 2021-05-28 PROCEDURE — 80061 LIPID PANEL: CPT | Performed by: FAMILY MEDICINE

## 2021-09-03 ENCOUNTER — APPOINTMENT (OUTPATIENT)
Dept: LAB | Facility: MEDICAL CENTER | Age: 46
End: 2021-09-03
Payer: COMMERCIAL

## 2021-12-02 ENCOUNTER — APPOINTMENT (OUTPATIENT)
Dept: LAB | Facility: MEDICAL CENTER | Age: 46
End: 2021-12-02
Payer: COMMERCIAL

## 2022-03-02 ENCOUNTER — APPOINTMENT (OUTPATIENT)
Dept: LAB | Facility: MEDICAL CENTER | Age: 47
End: 2022-03-02
Payer: COMMERCIAL

## 2022-03-02 DIAGNOSIS — Z01.84 IMMUNITY STATUS TESTING: ICD-10-CM

## 2022-03-02 DIAGNOSIS — I10 ESSENTIAL HYPERTENSION, BENIGN: ICD-10-CM

## 2022-03-02 DIAGNOSIS — E11.9 DIABETES MELLITUS WITHOUT COMPLICATION (HCC): ICD-10-CM

## 2022-03-02 DIAGNOSIS — E78.2 MIXED HYPERLIPIDEMIA: ICD-10-CM

## 2022-03-02 LAB
ALBUMIN SERPL BCP-MCNC: 3.9 G/DL (ref 3.5–5)
ALP SERPL-CCNC: 71 U/L (ref 46–116)
ALT SERPL W P-5'-P-CCNC: 74 U/L (ref 12–78)
ANION GAP SERPL CALCULATED.3IONS-SCNC: 5 MMOL/L (ref 4–13)
AST SERPL W P-5'-P-CCNC: 58 U/L (ref 5–45)
BILIRUB SERPL-MCNC: 0.47 MG/DL (ref 0.2–1)
BUN SERPL-MCNC: 10 MG/DL (ref 5–25)
CALCIUM SERPL-MCNC: 9.7 MG/DL (ref 8.3–10.1)
CHLORIDE SERPL-SCNC: 107 MMOL/L (ref 100–108)
CO2 SERPL-SCNC: 27 MMOL/L (ref 21–32)
CREAT SERPL-MCNC: 0.65 MG/DL (ref 0.6–1.3)
GFR SERPL CREATININE-BSD FRML MDRD: 115 ML/MIN/1.73SQ M
GLUCOSE P FAST SERPL-MCNC: 123 MG/DL (ref 65–99)
POTASSIUM SERPL-SCNC: 4.5 MMOL/L (ref 3.5–5.3)
PROT SERPL-MCNC: 7.9 G/DL (ref 6.4–8.2)
SARS-COV-2 IGG SERPL QL IA: NORMAL
SARS-COV-2 IGG+IGM SERPL QL IA: REACTIVE
SODIUM SERPL-SCNC: 139 MMOL/L (ref 136–145)

## 2022-03-02 PROCEDURE — 86769 SARS-COV-2 COVID-19 ANTIBODY: CPT

## 2022-03-02 PROCEDURE — 80053 COMPREHEN METABOLIC PANEL: CPT

## 2022-08-16 ENCOUNTER — APPOINTMENT (OUTPATIENT)
Dept: LAB | Facility: MEDICAL CENTER | Age: 47
End: 2022-08-16
Payer: COMMERCIAL

## 2022-08-16 DIAGNOSIS — E11.9 DIABETES MELLITUS WITHOUT COMPLICATION (HCC): ICD-10-CM

## 2022-08-16 DIAGNOSIS — R74.01 ELEVATED SGOT: ICD-10-CM

## 2022-08-16 DIAGNOSIS — I10 ESSENTIAL HYPERTENSION, BENIGN: ICD-10-CM

## 2022-08-16 DIAGNOSIS — Z01.84 IMMUNITY STATUS TESTING: ICD-10-CM

## 2022-08-16 LAB
ALBUMIN SERPL BCP-MCNC: 3.9 G/DL (ref 3.5–5)
ALP SERPL-CCNC: 62 U/L (ref 46–116)
ALT SERPL W P-5'-P-CCNC: 51 U/L (ref 12–78)
ANION GAP SERPL CALCULATED.3IONS-SCNC: 4 MMOL/L (ref 4–13)
AST SERPL W P-5'-P-CCNC: 44 U/L (ref 5–45)
BILIRUB SERPL-MCNC: 0.42 MG/DL (ref 0.2–1)
BUN SERPL-MCNC: 20 MG/DL (ref 5–25)
CALCIUM SERPL-MCNC: 9.6 MG/DL (ref 8.3–10.1)
CHLORIDE SERPL-SCNC: 106 MMOL/L (ref 96–108)
CO2 SERPL-SCNC: 27 MMOL/L (ref 21–32)
CREAT SERPL-MCNC: 0.64 MG/DL (ref 0.6–1.3)
GFR SERPL CREATININE-BSD FRML MDRD: 115 ML/MIN/1.73SQ M
GGT SERPL-CCNC: 88 U/L (ref 5–85)
GLUCOSE P FAST SERPL-MCNC: 104 MG/DL (ref 65–99)
POTASSIUM SERPL-SCNC: 4.4 MMOL/L (ref 3.5–5.3)
PROT SERPL-MCNC: 7.9 G/DL (ref 6.4–8.4)
SARS-COV-2 IGG SERPL QL IA: NORMAL
SARS-COV-2 IGG+IGM SERPL QL IA: REACTIVE
SODIUM SERPL-SCNC: 137 MMOL/L (ref 135–147)

## 2022-08-16 PROCEDURE — 82977 ASSAY OF GGT: CPT

## 2022-08-16 PROCEDURE — 80053 COMPREHEN METABOLIC PANEL: CPT

## 2022-08-16 PROCEDURE — 36415 COLL VENOUS BLD VENIPUNCTURE: CPT

## 2022-08-16 PROCEDURE — 86769 SARS-COV-2 COVID-19 ANTIBODY: CPT

## 2023-03-03 ENCOUNTER — APPOINTMENT (OUTPATIENT)
Dept: LAB | Facility: MEDICAL CENTER | Age: 48
End: 2023-03-03

## 2024-08-22 ENCOUNTER — APPOINTMENT (OUTPATIENT)
Dept: LAB | Facility: MEDICAL CENTER | Age: 49
End: 2024-08-22
Payer: COMMERCIAL

## 2024-08-22 DIAGNOSIS — E11.9 DIABETES MELLITUS WITHOUT COMPLICATION (HCC): ICD-10-CM

## 2024-08-22 LAB
ALBUMIN SERPL BCG-MCNC: 4.4 G/DL (ref 3.5–5)
ALP SERPL-CCNC: 62 U/L (ref 34–104)
ALT SERPL W P-5'-P-CCNC: 20 U/L (ref 7–52)
ANION GAP SERPL CALCULATED.3IONS-SCNC: 11 MMOL/L (ref 4–13)
AST SERPL W P-5'-P-CCNC: 20 U/L (ref 13–39)
BILIRUB SERPL-MCNC: 0.52 MG/DL (ref 0.2–1)
BUN SERPL-MCNC: 17 MG/DL (ref 5–25)
CALCIUM SERPL-MCNC: 9.5 MG/DL (ref 8.4–10.2)
CHLORIDE SERPL-SCNC: 100 MMOL/L (ref 96–108)
CO2 SERPL-SCNC: 25 MMOL/L (ref 21–32)
CREAT SERPL-MCNC: 0.57 MG/DL (ref 0.6–1.3)
GFR SERPL CREATININE-BSD FRML MDRD: 119 ML/MIN/1.73SQ M
GLUCOSE P FAST SERPL-MCNC: 102 MG/DL (ref 65–99)
POTASSIUM SERPL-SCNC: 4.5 MMOL/L (ref 3.5–5.3)
PROT SERPL-MCNC: 7.7 G/DL (ref 6.4–8.4)
SODIUM SERPL-SCNC: 136 MMOL/L (ref 135–147)

## 2024-08-22 PROCEDURE — 36415 COLL VENOUS BLD VENIPUNCTURE: CPT

## 2024-08-22 PROCEDURE — 80053 COMPREHEN METABOLIC PANEL: CPT

## 2024-09-20 ENCOUNTER — OFFICE VISIT (OUTPATIENT)
Dept: URGENT CARE | Facility: MEDICAL CENTER | Age: 49
End: 2024-09-20
Payer: COMMERCIAL

## 2024-09-20 ENCOUNTER — APPOINTMENT (OUTPATIENT)
Dept: RADIOLOGY | Facility: MEDICAL CENTER | Age: 49
End: 2024-09-20
Payer: COMMERCIAL

## 2024-09-20 VITALS
RESPIRATION RATE: 18 BRPM | OXYGEN SATURATION: 95 % | TEMPERATURE: 98.9 F | SYSTOLIC BLOOD PRESSURE: 143 MMHG | HEART RATE: 80 BPM | DIASTOLIC BLOOD PRESSURE: 63 MMHG

## 2024-09-20 DIAGNOSIS — R06.09 DYSPNEA ON EXERTION: ICD-10-CM

## 2024-09-20 DIAGNOSIS — R06.02 SHORTNESS OF BREATH: ICD-10-CM

## 2024-09-20 DIAGNOSIS — R05.1 ACUTE COUGH: Primary | ICD-10-CM

## 2024-09-20 DIAGNOSIS — R68.89 FLU-LIKE SYMPTOMS: ICD-10-CM

## 2024-09-20 PROBLEM — E78.5 HYPERLIPIDEMIA: Status: ACTIVE | Noted: 2017-11-28

## 2024-09-20 PROBLEM — R01.1 HEART MURMUR: Status: ACTIVE | Noted: 2024-09-20

## 2024-09-20 PROBLEM — E11.9 DM II (DIABETES MELLITUS, TYPE II), CONTROLLED (HCC): Status: ACTIVE | Noted: 2017-11-28

## 2024-09-20 PROBLEM — R10.9 LEFT FLANK PAIN: Status: ACTIVE | Noted: 2017-11-28

## 2024-09-20 PROBLEM — I10 BENIGN ESSENTIAL HYPERTENSION: Status: ACTIVE | Noted: 2020-05-11

## 2024-09-20 PROBLEM — R63.0 LOSS OF APPETITE: Status: ACTIVE | Noted: 2017-11-28

## 2024-09-20 PROCEDURE — S9083 URGENT CARE CENTER GLOBAL: HCPCS

## 2024-09-20 PROCEDURE — 71046 X-RAY EXAM CHEST 2 VIEWS: CPT

## 2024-09-20 PROCEDURE — G0382 LEV 3 HOSP TYPE B ED VISIT: HCPCS

## 2024-09-20 RX ORDER — AZITHROMYCIN 250 MG/1
TABLET, FILM COATED ORAL
Qty: 6 TABLET | Refills: 0 | Status: SHIPPED | OUTPATIENT
Start: 2024-09-20 | End: 2024-09-24

## 2024-09-20 RX ORDER — SITAGLIPTIN AND METFORMIN HYDROCHLORIDE 1000; 50 MG/1; MG/1
2 TABLET, FILM COATED, EXTENDED RELEASE ORAL
COMMUNITY
Start: 2024-09-04

## 2024-09-20 RX ORDER — GUAIFENESIN 600 MG/1
1200 TABLET, EXTENDED RELEASE ORAL EVERY 12 HOURS SCHEDULED
Qty: 28 TABLET | Refills: 0 | Status: SHIPPED | OUTPATIENT
Start: 2024-09-20 | End: 2024-09-27

## 2024-09-20 NOTE — PROGRESS NOTES
[facility]        NAME: Vincent Downing is a 48 y.o. male  : 1975    MRN: [id number]  DATE: 2024  TIME: 9:06 AM    Assessment and Plan   Acute cough [R05.1]  1. Acute cough  azithromycin (ZITHROMAX) 250 mg tablet    amoxicillin-clavulanate (AUGMENTIN) 875-125 mg per tablet    guaiFENesin (MUCINEX) 600 mg 12 hr tablet      2. Dyspnea on exertion  XR chest pa and lateral    azithromycin (ZITHROMAX) 250 mg tablet    amoxicillin-clavulanate (AUGMENTIN) 875-125 mg per tablet      3. Flu-like symptoms  azithromycin (ZITHROMAX) 250 mg tablet    amoxicillin-clavulanate (AUGMENTIN) 875-125 mg per tablet        XR chest pa & lateral: Questionable widening of the mediastinum vs patchy areas of consolidation per my read, pending radiology final read. Given symptoms and PE, will treat pt as a suspected pneumonia.     Final read: IMPRESSION: No acute cardiopulmonary disease.    Patient Instructions   Risks factors for drug resistance: (age <5 or >65 years included in risk factors): If yes then Fluoroquinolones or Augmentin + doxy or Zpak    Take Mucinex during the day as prescribed    Take antibiotics as prescribed.   Take entire course of antibiotics.     Eat yogurt with live and active cultures and/or take a probiotic at least 3 hours before or after antibiotic dose.   Monitor stool for diarrhea and/or blood. If this occurs, contact primary care doctor ASAP.     Recommend the following options: room humidifier, vicks vapo rub, hot/steamy shower.  Offer fluids frequently to help with hydration, as staying hydrated helps loosen up thick mucous.   May drink warm water with honey. May use lemon.  Tylenol/Ibuprofen for pain/fever.  Encourage coughing into the elbow instead of the hand.   Washing hands frequently with warm water and soap may help stop spread of infection.    If symptoms worsen, please proceed to the ER for further evaluation.    Follow up with your PCP in 4-6 weeks for a possible repeat XR      Follow up with PCP in 3-5 days.  Proceed to ER if symptoms worsen.    If tests are performed, our office will contact you with results only if changes need to made to the care plan discussed with you at the visit. [facility]    Chief Complaint     Chief Complaint   Patient presents with    Flu Symptoms     Patient states starting two weeks ago he had flu like symptoms (cough, fatigue, body aches, sweats, chills) states they went away and returned again a few days ago. Unsure if it is prolonged covid/flu    Shortness of Breath     SOB with exertion ongoing for over a week      History of Present Illness   Pt is a 47 y/o M who presents to the clinic for a CC of SOB with exertion, fatigue, loss of appetite, sweats and cough.     Pt reports symptoms started on 9/5/24, started to improve last Wed-Thurs, then he started to feel worse on Friday.     URI   This is a new problem. The current episode started 1 to 4 weeks ago (x2 weeks). Associated symptoms include congestion, coughing, diarrhea, rhinorrhea and a sore throat (improving). Pertinent negatives include no abdominal pain, chest pain, ear pain, headaches, nausea, rash, sinus pain, vomiting or wheezing. He has tried acetaminophen and NSAIDs (DayQuil, NyQuil) for the symptoms. The treatment provided no relief.       Review of Systems   Review of Systems   Constitutional:  Positive for activity change, appetite change (loss of appetite), chills, diaphoresis (waking up covered in sweat), fatigue and fever (felt feverish but has not taken temperature).   HENT:  Positive for congestion, postnasal drip, rhinorrhea and sore throat (improving). Negative for ear discharge, ear pain, sinus pressure and sinus pain.    Respiratory:  Positive for cough, chest tightness and shortness of breath (with exertion). Negative for wheezing.         Feels like he cannot take a deep breath   Cardiovascular: Negative.  Negative for chest pain  and palpitations.   Gastrointestinal:  Positive for diarrhea. Negative for abdominal pain, constipation, nausea and vomiting.   Musculoskeletal:  Positive for myalgias.   Skin:  Negative for color change, rash and wound.   Neurological:  Negative for headaches.     Current Medications       Current Outpatient Medications:     amoxicillin-clavulanate (AUGMENTIN) 875-125 mg per tablet, Take 1 tablet by mouth every 12 (twelve) hours for 7 days, Disp: 14 tablet, Rfl: 0    aspirin (ECOTRIN LOW STRENGTH) 81 mg EC tablet, Take 81 mg by mouth daily in the early morning, Disp: , Rfl:     atorvastatin (LIPITOR) 10 mg tablet, Take 10 mg by mouth daily at bedtime, Disp: , Rfl:     azithromycin (ZITHROMAX) 250 mg tablet, Take 2 tablets today then 1 tablet daily x 4 days, Disp: 6 tablet, Rfl: 0    guaiFENesin (MUCINEX) 600 mg 12 hr tablet, Take 2 tablets (1,200 mg total) by mouth every 12 (twelve) hours for 7 days, Disp: 28 tablet, Rfl: 0    Janumet XR  MG TB24, Take 2 tablets by mouth, Disp: , Rfl:     losartan (COZAAR) 50 mg tablet, Take 25 mg by mouth daily in the early morning, Disp: , Rfl:     multivitamin (THERAGRAN) TABS, Take 1 tablet by mouth daily in the early morning, Disp: , Rfl:     Omega-3 Fatty Acids (FISH OIL PO), Take by mouth, Disp: , Rfl:     Current Allergies     Allergies as of 09/20/2024    (No Known Allergies)            The following portions of the patient's history were reviewed and updated as appropriate: allergies, current medications, past family history, past medical history, past social history, past surgical history and problem list.     Past Medical History:   Diagnosis Date    CPAP (continuous positive airway pressure) dependence     Diabetes mellitus (HCC)     Heart murmur     since childhood    Hyperlipidemia     Hypertension     Sleep apnea        Past Surgical History:   Procedure Laterality Date    FOOT SURGERY      bunion surgery    NM COLONOSCOPY FLX DX W/COLLJ SPEC WHEN PFRMD N/A  12/29/2017    Procedure: EGD AND COLONOSCOPY;  Surgeon: Lucas Luna MD;  Location[facility];  Service: Gastroenterology    UNDESCENDED TESTICLE EXPLORATION      VASECTOMY         Family History   Problem Relation Age of Onset    Colon cancer Father          Medications have been verified.        Objective   /63   Pulse 80   Temp 98.9 °F (37.2 °C)   Resp 18   SpO2 95%        Physical Exam     Physical Exam  Vitals and nursing note reviewed.   Constitutional:       General: He is not in acute distress.     Appearance: Normal appearance. He is ill-appearing. He is not toxic-appearing or diaphoretic.   HENT:      Head: Normocephalic.      Right Ear: Tympanic membrane, ear canal and external ear normal. There is no impacted cerumen.      Left Ear: Tympanic membrane, ear canal and external ear normal. There is no impacted cerumen.      Nose: Nose normal. No congestion or rhinorrhea.      Mouth/Throat:      Mouth: Mucous membranes are moist.      Pharynx: Uvula midline. Pharyngeal swelling, posterior oropharyngeal erythema and uvula swelling present.   Cardiovascular:      Rate and Rhythm: Normal rate and regular rhythm.      Pulses: Normal pulses.      Heart sounds: Murmur heard.   Pulmonary:      Effort: Pulmonary effort is normal. No respiratory distress.      Breath sounds: No stridor. Examination of the right-lower field reveals rhonchi. Examination of the left-lower field reveals rhonchi. Rhonchi present. No wheezing or rales.      Comments: Moist, non-productive cough noted during examination, no conversational dyspnea  Chest:      Chest wall: No tenderness.   Musculoskeletal:         General: Normal range of motion.      Cervical back: Normal range of motion.   Lymphadenopathy:      Head:      Right side of head: No tonsillar adenopathy.      Left side of head: No tonsillar adenopathy.      Cervical: No cervical adenopathy.      Right cervical: No superficial cervical adenopathy.     Left cervical:  No superficial cervical adenopathy.   Skin:     General: Skin is warm.   Neurological:      Mental Status: He is alert.

## 2024-09-20 NOTE — PATIENT INSTRUCTIONS
Risks factors for drug resistance: (age <5 or >65 years included in risk factors): If yes then Fluoroquinolones or Augmentin + doxy or Zpak    Take Mucinex during the day as prescribed    Take antibiotics as prescribed.   Take entire course of antibiotics.     Eat yogurt with live and active cultures and/or take a probiotic at least 3 hours before or after antibiotic dose.   Monitor stool for diarrhea and/or blood. If this occurs, contact primary care doctor ASAP.     Recommend the following options: room humidifier, vicks vapo rub, hot/steamy shower.  Offer fluids frequently to help with hydration, as staying hydrated helps loosen up thick mucous.   May drink warm water with honey. May use lemon.  Tylenol/Ibuprofen for pain/fever.  Encourage coughing into the elbow instead of the hand.   Washing hands frequently with warm water and soap may help stop spread of infection.    If symptoms worsen, please proceed to the ER for further evaluation.    Follow up with your PCP in 4-6 weeks for a possible repeat XR     Follow up with PCP in 3-5 days.  Proceed to ER if symptoms worsen.    If tests are performed, our office will contact you with results only if changes need to made to the care plan discussed with you at the visit. You can review your full results on [facility].

## 2024-11-15 ENCOUNTER — APPOINTMENT (OUTPATIENT)
Dept: LAB | Facility: MEDICAL CENTER | Age: 49
End: 2024-11-15
Payer: COMMERCIAL

## 2024-11-15 DIAGNOSIS — E11.9 DIABETES MELLITUS WITHOUT COMPLICATION (HCC): ICD-10-CM

## 2024-11-15 DIAGNOSIS — K90.9 ABNORMAL INTESTINAL ABSORPTION: ICD-10-CM

## 2024-11-15 DIAGNOSIS — Z00.00 ROUTINE GENERAL MEDICAL EXAMINATION AT A HEALTH CARE FACILITY: ICD-10-CM

## 2024-11-15 DIAGNOSIS — R79.89 HYPOURICEMIA: ICD-10-CM

## 2024-11-15 LAB
25(OH)D3 SERPL-MCNC: 31.8 NG/ML (ref 30–100)
ALBUMIN SERPL BCG-MCNC: 4.2 G/DL (ref 3.5–5)
ALP SERPL-CCNC: 64 U/L (ref 34–104)
ALT SERPL W P-5'-P-CCNC: 24 U/L (ref 7–52)
ANION GAP SERPL CALCULATED.3IONS-SCNC: 7 MMOL/L (ref 4–13)
AST SERPL W P-5'-P-CCNC: 22 U/L (ref 13–39)
BASOPHILS # BLD AUTO: 0.06 THOUSANDS/ÂΜL (ref 0–0.1)
BASOPHILS NFR BLD AUTO: 1 % (ref 0–1)
BILIRUB SERPL-MCNC: 0.48 MG/DL (ref 0.2–1)
BUN SERPL-MCNC: 15 MG/DL (ref 5–25)
CALCIUM SERPL-MCNC: 9.6 MG/DL (ref 8.4–10.2)
CHLORIDE SERPL-SCNC: 104 MMOL/L (ref 96–108)
CHOLEST SERPL-MCNC: 142 MG/DL (ref ?–200)
CO2 SERPL-SCNC: 28 MMOL/L (ref 21–32)
CREAT SERPL-MCNC: 0.5 MG/DL (ref 0.6–1.3)
EOSINOPHIL # BLD AUTO: 0.22 THOUSAND/ÂΜL (ref 0–0.61)
EOSINOPHIL NFR BLD AUTO: 3 % (ref 0–6)
ERYTHROCYTE [DISTWIDTH] IN BLOOD BY AUTOMATED COUNT: 13.6 % (ref 11.6–15.1)
EST. AVERAGE GLUCOSE BLD GHB EST-MCNC: 128 MG/DL
GFR SERPL CREATININE-BSD FRML MDRD: 125 ML/MIN/1.73SQ M
GLUCOSE P FAST SERPL-MCNC: 115 MG/DL (ref 65–99)
HBA1C MFR BLD: 6.1 %
HCT VFR BLD AUTO: 49.8 % (ref 36.5–49.3)
HDLC SERPL-MCNC: 39 MG/DL
HGB BLD-MCNC: 15.9 G/DL (ref 12–17)
IMM GRANULOCYTES # BLD AUTO: 0.03 THOUSAND/UL (ref 0–0.2)
IMM GRANULOCYTES NFR BLD AUTO: 0 % (ref 0–2)
LDLC SERPL CALC-MCNC: 54 MG/DL (ref 0–100)
LYMPHOCYTES # BLD AUTO: 3.26 THOUSANDS/ÂΜL (ref 0.6–4.47)
LYMPHOCYTES NFR BLD AUTO: 42 % (ref 14–44)
MCH RBC QN AUTO: 28.4 PG (ref 26.8–34.3)
MCHC RBC AUTO-ENTMCNC: 31.9 G/DL (ref 31.4–37.4)
MCV RBC AUTO: 89 FL (ref 82–98)
MONOCYTES # BLD AUTO: 0.92 THOUSAND/ÂΜL (ref 0.17–1.22)
MONOCYTES NFR BLD AUTO: 12 % (ref 4–12)
NEUTROPHILS # BLD AUTO: 3.35 THOUSANDS/ÂΜL (ref 1.85–7.62)
NEUTS SEG NFR BLD AUTO: 42 % (ref 43–75)
NONHDLC SERPL-MCNC: 103 MG/DL
NRBC BLD AUTO-RTO: 0 /100 WBCS
PLATELET # BLD AUTO: 200 THOUSANDS/UL (ref 149–390)
PMV BLD AUTO: 10.1 FL (ref 8.9–12.7)
POTASSIUM SERPL-SCNC: 4.8 MMOL/L (ref 3.5–5.3)
PROT SERPL-MCNC: 7.5 G/DL (ref 6.4–8.4)
RBC # BLD AUTO: 5.59 MILLION/UL (ref 3.88–5.62)
SODIUM SERPL-SCNC: 139 MMOL/L (ref 135–147)
TRIGL SERPL-MCNC: 246 MG/DL (ref ?–150)
TSH SERPL DL<=0.05 MIU/L-ACNC: 1.92 UIU/ML (ref 0.45–4.5)
WBC # BLD AUTO: 7.84 THOUSAND/UL (ref 4.31–10.16)

## 2024-11-15 PROCEDURE — 80061 LIPID PANEL: CPT

## 2024-11-15 PROCEDURE — 83036 HEMOGLOBIN GLYCOSYLATED A1C: CPT

## 2024-11-15 PROCEDURE — 84443 ASSAY THYROID STIM HORMONE: CPT

## 2024-11-15 PROCEDURE — 84153 ASSAY OF PSA TOTAL: CPT

## 2024-11-15 PROCEDURE — 36415 COLL VENOUS BLD VENIPUNCTURE: CPT

## 2024-11-15 PROCEDURE — 85025 COMPLETE CBC W/AUTO DIFF WBC: CPT

## 2024-11-15 PROCEDURE — 82306 VITAMIN D 25 HYDROXY: CPT

## 2024-11-15 PROCEDURE — 80053 COMPREHEN METABOLIC PANEL: CPT

## 2024-11-18 LAB — MISCELLANEOUS LAB TEST RESULT: NORMAL
